# Patient Record
Sex: MALE | Race: OTHER | NOT HISPANIC OR LATINO | URBAN - METROPOLITAN AREA
[De-identification: names, ages, dates, MRNs, and addresses within clinical notes are randomized per-mention and may not be internally consistent; named-entity substitution may affect disease eponyms.]

---

## 2017-09-30 ENCOUNTER — EMERGENCY (EMERGENCY)
Facility: HOSPITAL | Age: 37
LOS: 1 days | Discharge: HOME | End: 2017-09-30
Attending: EMERGENCY MEDICINE
Payer: MEDICAID

## 2017-09-30 VITALS
HEART RATE: 66 BPM | OXYGEN SATURATION: 99 % | SYSTOLIC BLOOD PRESSURE: 121 MMHG | TEMPERATURE: 98 F | HEIGHT: 66 IN | RESPIRATION RATE: 16 BRPM | DIASTOLIC BLOOD PRESSURE: 74 MMHG | WEIGHT: 134.92 LBS

## 2017-09-30 DIAGNOSIS — R51 HEADACHE: ICD-10-CM

## 2017-09-30 DIAGNOSIS — R42 DIZZINESS AND GIDDINESS: ICD-10-CM

## 2017-09-30 PROCEDURE — 99285 EMERGENCY DEPT VISIT HI MDM: CPT | Mod: 25

## 2017-09-30 RX ORDER — OXYCODONE AND ACETAMINOPHEN 5; 325 MG/1; MG/1
1 TABLET ORAL ONCE
Qty: 0 | Refills: 0 | Status: DISCONTINUED | OUTPATIENT
Start: 2017-09-30 | End: 2017-09-30

## 2017-09-30 RX ORDER — BUTALBITAL/ACETAMINOPHEN 50MG-650MG
0 TABLET ORAL
Qty: 0 | Refills: 0 | COMMUNITY

## 2017-09-30 NOTE — ED PROVIDER NOTE - OBJECTIVE STATEMENT
36 y/o M pt with PMHx of headache to the back of the head with associated eye pain x 6 months; headache is worse in the morning. Pt reports he has seen his PMD for the headache complaint and was prescribed an unknown medication without any relief. Pt was also referred to f/u with a neurologist however, the doctor does not have any sooner appointment therefore pt visits the ED for sooner evaluation. Pt denies fever, chills, visual changes, shortness of breath, cough, chest pain, palpitations, nausea, vomiting, diarrhea, abd pain, numbness, tingling, weakness, H/O migraines, or any other complaints. NKDA.

## 2017-09-30 NOTE — ED PROVIDER NOTE - PROGRESS NOTE DETAILS
Pt reassessed, headache improved.  Labs and imaging negative.  Pt given copy of all results.  Will d/c

## 2017-09-30 NOTE — ED PROVIDER NOTE - CHPI ED SYMPTOMS NEG
no fever, no chills, no visual changes, no nausea, no vomiting, no diarrhea, no abd pain, no numbness, no tingling, no weakness

## 2017-10-01 VITALS
OXYGEN SATURATION: 99 % | HEART RATE: 65 BPM | RESPIRATION RATE: 18 BRPM | DIASTOLIC BLOOD PRESSURE: 68 MMHG | SYSTOLIC BLOOD PRESSURE: 126 MMHG | TEMPERATURE: 99 F

## 2017-10-01 LAB
ANION GAP SERPL CALC-SCNC: 5 MMOL/L — SIGNIFICANT CHANGE UP (ref 5–17)
BASOPHILS # BLD AUTO: 0.1 K/UL — SIGNIFICANT CHANGE UP (ref 0–0.2)
BASOPHILS NFR BLD AUTO: 1.6 % — SIGNIFICANT CHANGE UP (ref 0–2)
BUN SERPL-MCNC: 13 MG/DL — SIGNIFICANT CHANGE UP (ref 7–18)
CALCIUM SERPL-MCNC: 8.5 MG/DL — SIGNIFICANT CHANGE UP (ref 8.4–10.5)
CHLORIDE SERPL-SCNC: 105 MMOL/L — SIGNIFICANT CHANGE UP (ref 96–108)
CO2 SERPL-SCNC: 28 MMOL/L — SIGNIFICANT CHANGE UP (ref 22–31)
CREAT SERPL-MCNC: 1.22 MG/DL — SIGNIFICANT CHANGE UP (ref 0.5–1.3)
EOSINOPHIL # BLD AUTO: 0.5 K/UL — SIGNIFICANT CHANGE UP (ref 0–0.5)
EOSINOPHIL NFR BLD AUTO: 8 % — HIGH (ref 0–6)
GLUCOSE SERPL-MCNC: 104 MG/DL — HIGH (ref 70–99)
HCT VFR BLD CALC: 44 % — SIGNIFICANT CHANGE UP (ref 39–50)
HGB BLD-MCNC: 15 G/DL — SIGNIFICANT CHANGE UP (ref 13–17)
LYMPHOCYTES # BLD AUTO: 2.4 K/UL — SIGNIFICANT CHANGE UP (ref 1–3.3)
LYMPHOCYTES # BLD AUTO: 41.2 % — SIGNIFICANT CHANGE UP (ref 13–44)
MCHC RBC-ENTMCNC: 31 PG — SIGNIFICANT CHANGE UP (ref 27–34)
MCHC RBC-ENTMCNC: 34.2 GM/DL — SIGNIFICANT CHANGE UP (ref 32–36)
MCV RBC AUTO: 90.6 FL — SIGNIFICANT CHANGE UP (ref 80–100)
MONOCYTES # BLD AUTO: 0.5 K/UL — SIGNIFICANT CHANGE UP (ref 0–0.9)
MONOCYTES NFR BLD AUTO: 9 % — SIGNIFICANT CHANGE UP (ref 2–14)
NEUTROPHILS # BLD AUTO: 2.3 K/UL — SIGNIFICANT CHANGE UP (ref 1.8–7.4)
NEUTROPHILS NFR BLD AUTO: 40.2 % — LOW (ref 43–77)
PLATELET # BLD AUTO: 188 K/UL — SIGNIFICANT CHANGE UP (ref 150–400)
POTASSIUM SERPL-MCNC: 4 MMOL/L — SIGNIFICANT CHANGE UP (ref 3.5–5.3)
POTASSIUM SERPL-SCNC: 4 MMOL/L — SIGNIFICANT CHANGE UP (ref 3.5–5.3)
RBC # BLD: 4.86 M/UL — SIGNIFICANT CHANGE UP (ref 4.2–5.8)
RBC # FLD: 11.4 % — SIGNIFICANT CHANGE UP (ref 10.3–14.5)
SODIUM SERPL-SCNC: 138 MMOL/L — SIGNIFICANT CHANGE UP (ref 135–145)
WBC # BLD: 5.8 K/UL — SIGNIFICANT CHANGE UP (ref 3.8–10.5)
WBC # FLD AUTO: 5.8 K/UL — SIGNIFICANT CHANGE UP (ref 3.8–10.5)

## 2017-10-01 PROCEDURE — 80048 BASIC METABOLIC PNL TOTAL CA: CPT

## 2017-10-01 PROCEDURE — 99284 EMERGENCY DEPT VISIT MOD MDM: CPT | Mod: 25

## 2017-10-01 PROCEDURE — 70450 CT HEAD/BRAIN W/O DYE: CPT | Mod: 26

## 2017-10-01 PROCEDURE — 85027 COMPLETE CBC AUTOMATED: CPT

## 2017-10-01 PROCEDURE — 70450 CT HEAD/BRAIN W/O DYE: CPT

## 2017-10-01 RX ADMIN — OXYCODONE AND ACETAMINOPHEN 1 TABLET(S): 5; 325 TABLET ORAL at 00:17

## 2017-10-01 RX ADMIN — OXYCODONE AND ACETAMINOPHEN 1 TABLET(S): 5; 325 TABLET ORAL at 01:00

## 2019-05-10 ENCOUNTER — EMERGENCY (EMERGENCY)
Facility: HOSPITAL | Age: 39
LOS: 1 days | Discharge: HOME | End: 2019-05-10
Attending: EMERGENCY MEDICINE | Admitting: EMERGENCY MEDICINE
Payer: MEDICAID

## 2019-05-10 VITALS
SYSTOLIC BLOOD PRESSURE: 103 MMHG | OXYGEN SATURATION: 100 % | TEMPERATURE: 98 F | DIASTOLIC BLOOD PRESSURE: 71 MMHG | RESPIRATION RATE: 16 BRPM | HEART RATE: 68 BPM

## 2019-05-10 VITALS
TEMPERATURE: 98 F | OXYGEN SATURATION: 99 % | RESPIRATION RATE: 16 BRPM | DIASTOLIC BLOOD PRESSURE: 69 MMHG | HEART RATE: 88 BPM | SYSTOLIC BLOOD PRESSURE: 122 MMHG

## 2019-05-10 LAB
ALBUMIN SERPL ELPH-MCNC: 4.54 G/DL — SIGNIFICANT CHANGE UP (ref 3.3–5)
ALP SERPL-CCNC: 50 U/L — SIGNIFICANT CHANGE UP (ref 40–120)
ALT FLD-CCNC: 16 U/L — SIGNIFICANT CHANGE UP (ref 4–41)
ANION GAP SERPL CALC-SCNC: 13 MMO/L — SIGNIFICANT CHANGE UP (ref 7–14)
APPEARANCE UR: CLEAR — SIGNIFICANT CHANGE UP
AST SERPL-CCNC: 18 U/L — SIGNIFICANT CHANGE UP (ref 4–40)
BASOPHILS # BLD AUTO: 0.06 K/UL — SIGNIFICANT CHANGE UP (ref 0–0.2)
BASOPHILS NFR BLD AUTO: 0.7 % — SIGNIFICANT CHANGE UP (ref 0–2)
BILIRUB SERPL-MCNC: 0.3 MG/DL — SIGNIFICANT CHANGE UP (ref 0.2–1.2)
BILIRUB UR-MCNC: NEGATIVE — SIGNIFICANT CHANGE UP
BLOOD UR QL VISUAL: NEGATIVE — SIGNIFICANT CHANGE UP
BUN SERPL-MCNC: 22 MG/DL — SIGNIFICANT CHANGE UP (ref 7–23)
CALCIUM SERPL-MCNC: 9.5 MG/DL — SIGNIFICANT CHANGE UP (ref 8.4–10.5)
CHLORIDE SERPL-SCNC: 100 MMOL/L — SIGNIFICANT CHANGE UP (ref 98–107)
CO2 SERPL-SCNC: 26 MMOL/L — SIGNIFICANT CHANGE UP (ref 22–31)
COLOR SPEC: SIGNIFICANT CHANGE UP
CREAT SERPL-MCNC: 1.43 MG/DL — HIGH (ref 0.5–1.3)
EOSINOPHIL # BLD AUTO: 0.44 K/UL — SIGNIFICANT CHANGE UP (ref 0–0.5)
EOSINOPHIL NFR BLD AUTO: 5.4 % — SIGNIFICANT CHANGE UP (ref 0–6)
GLUCOSE SERPL-MCNC: 87 MG/DL — SIGNIFICANT CHANGE UP (ref 70–99)
GLUCOSE UR-MCNC: NEGATIVE — SIGNIFICANT CHANGE UP
HCT VFR BLD CALC: 39.8 % — SIGNIFICANT CHANGE UP (ref 39–50)
HGB BLD-MCNC: 13.9 G/DL — SIGNIFICANT CHANGE UP (ref 13–17)
IMM GRANULOCYTES NFR BLD AUTO: 0.2 % — SIGNIFICANT CHANGE UP (ref 0–1.5)
KETONES UR-MCNC: NEGATIVE — SIGNIFICANT CHANGE UP
LEUKOCYTE ESTERASE UR-ACNC: NEGATIVE — SIGNIFICANT CHANGE UP
LYMPHOCYTES # BLD AUTO: 3 K/UL — SIGNIFICANT CHANGE UP (ref 1–3.3)
LYMPHOCYTES # BLD AUTO: 36.9 % — SIGNIFICANT CHANGE UP (ref 13–44)
MCHC RBC-ENTMCNC: 29.6 PG — SIGNIFICANT CHANGE UP (ref 27–34)
MCHC RBC-ENTMCNC: 34.9 % — SIGNIFICANT CHANGE UP (ref 32–36)
MCV RBC AUTO: 84.7 FL — SIGNIFICANT CHANGE UP (ref 80–100)
MONOCYTES # BLD AUTO: 0.8 K/UL — SIGNIFICANT CHANGE UP (ref 0–0.9)
MONOCYTES NFR BLD AUTO: 9.8 % — SIGNIFICANT CHANGE UP (ref 2–14)
NEUTROPHILS # BLD AUTO: 3.81 K/UL — SIGNIFICANT CHANGE UP (ref 1.8–7.4)
NEUTROPHILS NFR BLD AUTO: 47 % — SIGNIFICANT CHANGE UP (ref 43–77)
NITRITE UR-MCNC: NEGATIVE — SIGNIFICANT CHANGE UP
NRBC # FLD: 0 K/UL — SIGNIFICANT CHANGE UP (ref 0–0)
PH UR: 6.5 — SIGNIFICANT CHANGE UP (ref 5–8)
PLATELET # BLD AUTO: 206 K/UL — SIGNIFICANT CHANGE UP (ref 150–400)
PMV BLD: 8.9 FL — SIGNIFICANT CHANGE UP (ref 7–13)
POTASSIUM SERPL-MCNC: 4.1 MMOL/L — SIGNIFICANT CHANGE UP (ref 3.5–5.3)
POTASSIUM SERPL-SCNC: 4.1 MMOL/L — SIGNIFICANT CHANGE UP (ref 3.5–5.3)
PROT SERPL-MCNC: 7.1 G/DL — SIGNIFICANT CHANGE UP (ref 6–8.3)
PROT UR-MCNC: NEGATIVE — SIGNIFICANT CHANGE UP
RBC # BLD: 4.7 M/UL — SIGNIFICANT CHANGE UP (ref 4.2–5.8)
RBC # FLD: 12 % — SIGNIFICANT CHANGE UP (ref 10.3–14.5)
SODIUM SERPL-SCNC: 139 MMOL/L — SIGNIFICANT CHANGE UP (ref 135–145)
SP GR SPEC: 1.02 — SIGNIFICANT CHANGE UP (ref 1–1.04)
UROBILINOGEN FLD QL: NORMAL — SIGNIFICANT CHANGE UP
WBC # BLD: 8.13 K/UL — SIGNIFICANT CHANGE UP (ref 3.8–10.5)
WBC # FLD AUTO: 8.13 K/UL — SIGNIFICANT CHANGE UP (ref 3.8–10.5)

## 2019-05-10 PROCEDURE — 99284 EMERGENCY DEPT VISIT MOD MDM: CPT

## 2019-05-10 PROCEDURE — 74176 CT ABD & PELVIS W/O CONTRAST: CPT | Mod: 26,GC

## 2019-05-10 RX ORDER — SODIUM CHLORIDE 9 MG/ML
3 INJECTION INTRAMUSCULAR; INTRAVENOUS; SUBCUTANEOUS ONCE
Refills: 0 | Status: COMPLETED | OUTPATIENT
Start: 2019-05-10 | End: 2019-05-10

## 2019-05-10 RX ORDER — KETOROLAC TROMETHAMINE 30 MG/ML
30 SYRINGE (ML) INJECTION ONCE
Refills: 0 | Status: DISCONTINUED | OUTPATIENT
Start: 2019-05-10 | End: 2019-05-10

## 2019-05-10 RX ADMIN — SODIUM CHLORIDE 3 MILLILITER(S): 9 INJECTION INTRAMUSCULAR; INTRAVENOUS; SUBCUTANEOUS at 22:04

## 2019-05-10 RX ADMIN — Medication 30 MILLIGRAM(S): at 22:04

## 2019-05-10 NOTE — ED ADULT NURSE NOTE - OBJECTIVE STATEMENT
pt is in bed A and OX 3  in NAD, pt reports ' I have pain in my left side of the abdomen and it goes to my left leg and my leg gets numb' pt reports hx of sciatica, denies fever or chills c/o burning on urination.  abd soft non distended BS present 4 quadrants. pt ambulates with a steady giant, extremities are equal in strength B/L.

## 2019-05-10 NOTE — ED ADULT NURSE NOTE - ED STAT RN HANDOFF DETAILS
Patient left in stable condition, ambulatory, accompanied by family. He denies any pain at that time.

## 2019-05-10 NOTE — ED PROVIDER NOTE - MUSCULOSKELETAL, MLM
Spine appears normal, range of motion is not limited, no muscle or joint tenderness. +Left straight leg raise, left paraspinal tenderness.

## 2019-05-10 NOTE — ED PROVIDER NOTE - CLINICAL SUMMARY MEDICAL DECISION MAKING FREE TEXT BOX
38 y/o male presents to the ED c/o left lower back pain that radiates to groin and left leg for x2 weeks. Will check labs, urinalysis, get imaging and medicate.

## 2019-05-10 NOTE — ED PROVIDER NOTE - CHPI ED SYMPTOMS NEG
No trauma, urinary complaints, hematuria, chills, and bowel or urinary incontinence/no weakness/no fever

## 2019-05-10 NOTE — ED PROVIDER NOTE - OBJECTIVE STATEMENT
40 y/o male with no pertinent PMHx presents to the ED c/o left lower back pain that radiates to groin and left leg for x2 weeks. Pt states that pain worsened 1 week ago after carrying ladder and playing cricket. The following morning, pain was much worse. At time of eval pt notes that is worse after lifting. Pt denies trauma, urinary complaints, hematuria, fever, chills, bowel or urinary incontinence, or weakness.

## 2019-05-10 NOTE — ED ADULT NURSE NOTE - NSIMPLEMENTINTERV_GEN_ALL_ED
Implemented All Universal Safety Interventions:  [facility] to call system. Call bell, personal items and telephone within reach. Instruct patient to call for assistance. Room bathroom lighting operational. Non-slip footwear when patient is off stretcher. Physically safe environment: no spills, clutter or unnecessary equipment. Stretcher in lowest position, wheels locked, appropriate side rails in place.

## 2019-05-11 RX ORDER — IBUPROFEN 200 MG
1 TABLET ORAL
Qty: 30 | Refills: 0
Start: 2019-05-11 | End: 2019-05-20

## 2019-05-11 RX ORDER — CYCLOBENZAPRINE HYDROCHLORIDE 10 MG/1
1 TABLET, FILM COATED ORAL
Qty: 21 | Refills: 0
Start: 2019-05-11 | End: 2019-05-17

## 2019-06-10 ENCOUNTER — EMERGENCY (EMERGENCY)
Facility: HOSPITAL | Age: 39
LOS: 1 days | Discharge: HOME | End: 2019-06-10
Attending: STUDENT IN AN ORGANIZED HEALTH CARE EDUCATION/TRAINING PROGRAM
Payer: MEDICAID

## 2019-06-10 VITALS
TEMPERATURE: 98 F | RESPIRATION RATE: 16 BRPM | OXYGEN SATURATION: 99 % | DIASTOLIC BLOOD PRESSURE: 78 MMHG | SYSTOLIC BLOOD PRESSURE: 120 MMHG | WEIGHT: 130.07 LBS | HEIGHT: 66 IN | HEART RATE: 82 BPM

## 2019-06-10 LAB
ANION GAP SERPL CALC-SCNC: 8 MMOL/L — SIGNIFICANT CHANGE UP (ref 5–17)
BUN SERPL-MCNC: 16 MG/DL — SIGNIFICANT CHANGE UP (ref 7–18)
CALCIUM SERPL-MCNC: 8.2 MG/DL — LOW (ref 8.4–10.5)
CHLORIDE SERPL-SCNC: 104 MMOL/L — SIGNIFICANT CHANGE UP (ref 96–108)
CK SERPL-CCNC: 1923 U/L — HIGH (ref 35–232)
CO2 SERPL-SCNC: 27 MMOL/L — SIGNIFICANT CHANGE UP (ref 22–31)
CREAT SERPL-MCNC: 1.14 MG/DL — SIGNIFICANT CHANGE UP (ref 0.5–1.3)
GLUCOSE SERPL-MCNC: 110 MG/DL — HIGH (ref 70–99)
POTASSIUM SERPL-MCNC: 3.8 MMOL/L — SIGNIFICANT CHANGE UP (ref 3.5–5.3)
POTASSIUM SERPL-SCNC: 3.8 MMOL/L — SIGNIFICANT CHANGE UP (ref 3.5–5.3)
SODIUM SERPL-SCNC: 139 MMOL/L — SIGNIFICANT CHANGE UP (ref 135–145)

## 2019-06-10 PROCEDURE — 99283 EMERGENCY DEPT VISIT LOW MDM: CPT

## 2019-06-10 PROCEDURE — 36415 COLL VENOUS BLD VENIPUNCTURE: CPT

## 2019-06-10 PROCEDURE — 82550 ASSAY OF CK (CPK): CPT

## 2019-06-10 PROCEDURE — 80048 BASIC METABOLIC PNL TOTAL CA: CPT

## 2019-06-10 PROCEDURE — 99284 EMERGENCY DEPT VISIT MOD MDM: CPT

## 2019-06-10 RX ORDER — SODIUM CHLORIDE 9 MG/ML
1000 INJECTION INTRAMUSCULAR; INTRAVENOUS; SUBCUTANEOUS ONCE
Refills: 0 | Status: COMPLETED | OUTPATIENT
Start: 2019-06-10 | End: 2019-06-10

## 2019-06-10 RX ORDER — IBUPROFEN 200 MG
1 TABLET ORAL
Qty: 20 | Refills: 0
Start: 2019-06-10 | End: 2019-06-14

## 2019-06-10 RX ADMIN — SODIUM CHLORIDE 1000 MILLILITER(S): 9 INJECTION INTRAMUSCULAR; INTRAVENOUS; SUBCUTANEOUS at 20:55

## 2019-06-10 RX ADMIN — SODIUM CHLORIDE 1000 MILLILITER(S): 9 INJECTION INTRAMUSCULAR; INTRAVENOUS; SUBCUTANEOUS at 21:00

## 2019-06-10 NOTE — ED PROVIDER NOTE - PHYSICAL EXAMINATION
Bilateral hip full range of motion. Femoral, popliteal and DP/PT pulses intact 2+ bilaterally. R groin localized tenderness. R inner thigh moderate swelling with ecchymosis. Compartments are soft. Pain increased with adduction against resistance.

## 2019-06-10 NOTE — ED PROVIDER NOTE - CLINICAL SUMMARY MEDICAL DECISION MAKING FREE TEXT BOX
Neurovasc intact. Very low suspicion of compartment syndrome. Likely partial adductor muscle tear with hematoma. Will check CK and outpatient ortho follow up within 1 week.

## 2019-06-10 NOTE — ED PROVIDER NOTE - ATTENDING CONTRIBUTION TO CARE
I agree with np assessment  patient presenting with left inner thigh pain   ambulatory  echimosis noted to left medial thigh  compartment soft  distal pulse intact  likely msk pain/partial tear  will obtain labs pain control reeassess  no signs of compartment on exam

## 2019-06-10 NOTE — ED PROVIDER NOTE - OBJECTIVE STATEMENT
39 year-old male, no significant PMHx, presents with cc R inner thigh pain since yesterday. While playing cricket at around 11:30 AM felt sudden onset of R groin pain and felt a tearing sensation. Continued to play the game. In the evening applying heating pad. In the Am woke up with R inner thigh bruising and swelling. Pain is worse today. Worse with adduction movement and with walking. Denies any numbness, tingling, pallor, coldness, testicular pain/swelling/bruising or any other complaints.

## 2019-06-10 NOTE — ED PROVIDER NOTE - PROGRESS NOTE DETAILS
Teodoro WNL. CK 1923. Will dc with ortho follow up within 1 week. Pt is well appearing walking with steady gait, stable for discharge and follow up without fail with medical doctor. I had a detailed discussion with the patient and/or guardian regarding the historical points, exam findings, and any diagnostic results supporting the discharge diagnosis. Pt educated on care and need for follow up. Strict return instructions and red flag signs and symptoms discussed with patient. Questions answered. Pt shows understanding of discharge information and agrees to follow.

## 2019-06-11 PROBLEM — Z78.9 OTHER SPECIFIED HEALTH STATUS: Chronic | Status: ACTIVE | Noted: 2019-05-11

## 2020-07-20 NOTE — ED PROVIDER NOTE - NS ED NOTE AC HIGH RISK COUNTRIES
oriented to person, place and time, normal sensation, short and long term memory intact, sensory exam intact No

## 2022-01-03 NOTE — ED ADULT TRIAGE NOTE - PAIN RATING/NUMBER SCALE (0-10): ACTIVITY
Detail Level: Simple Detail Level: Detailed Detail Level: Zone 8 Patient Specific Counseling (Will Not Stick From Patient To Patient): Went over what he was allergic to when did patch testing on him, Imidazoludydinyl urea and Diazolidinyl urea, printed those sheets out for him so he could go over the products he is using with what he is allergic to.  Also discussed possible referral for more in depth patch testing and UVB phototherapy

## 2024-07-29 PROBLEM — Z00.00 ENCOUNTER FOR PREVENTIVE HEALTH EXAMINATION: Status: ACTIVE | Noted: 2024-07-29

## 2024-07-31 NOTE — HISTORY OF PRESENT ILLNESS
[de-identified] : 43 y/o male with PMHx of  who presents today for evaluation of cervical arachnoid cyst  dx during workup for

## 2024-08-01 NOTE — HISTORY OF PRESENT ILLNESS
[de-identified] : 45 y/o male with PMHx of  who presents today for evaluation of cervical arachnoid cyst  dx during workup for   (bringing imaging with)

## 2024-08-04 NOTE — HISTORY OF PRESENT ILLNESS
[de-identified] : 45 y/o male with PMHx of  who presents today for evaluation of cervical arachnoid cyst  dx during workup for   (bringing imaging with)

## 2024-08-06 ENCOUNTER — APPOINTMENT (OUTPATIENT)
Dept: NEUROSURGERY | Facility: CLINIC | Age: 44
End: 2024-08-06

## 2024-08-06 PROBLEM — M54.12 CERVICAL RADICULOPATHY: Status: ACTIVE | Noted: 2024-08-06

## 2024-08-06 PROBLEM — G93.9 BRAIN LESION: Status: ACTIVE | Noted: 2024-08-06

## 2024-08-06 PROBLEM — G93.0 ARACHNOID CYST: Status: ACTIVE | Noted: 2024-07-31

## 2024-08-06 PROBLEM — R20.0 NUMBNESS AND TINGLING: Status: ACTIVE | Noted: 2024-08-06

## 2024-08-06 PROCEDURE — 99202 OFFICE O/P NEW SF 15 MIN: CPT
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
## 2024-08-06 NOTE — PHYSICAL EXAM
[Oriented To Time, Place, And Person] : oriented to person, place, and time [Impaired Insight] : insight and judgment were intact [Affect] : the affect was normal [Memory Recent] : recent memory was not impaired [Motor Tone] : muscle tone was normal in all four extremities [Motor Strength] : muscle strength was normal in all four extremities [Sensation Tactile Decrease] : light touch was intact [Sclera] : the sclera and conjunctiva were normal [PERRL With Normal Accommodation] : pupils were equal in size, round, reactive to light, with normal accommodation [Neck Appearance] : the appearance of the neck was normal [] : no respiratory distress [Respiration, Rhythm And Depth] : normal respiratory rhythm and effort [Abnormal Walk] : normal gait [Skin Color & Pigmentation] : normal skin color and pigmentation

## 2024-08-06 NOTE — REVIEW OF SYSTEMS
[Numbness] : numbness [Tingling] : tingling [Eyesight Problems] : eyesight problems [As Noted in HPI] : as noted in HPI

## 2024-08-08 ENCOUNTER — APPOINTMENT (OUTPATIENT)
Dept: NEUROSURGERY | Facility: CLINIC | Age: 44
End: 2024-08-08

## 2024-08-08 ENCOUNTER — NON-APPOINTMENT (OUTPATIENT)
Age: 44
End: 2024-08-08

## 2024-09-03 PROBLEM — G93.0 BRAIN CYST: Status: ACTIVE | Noted: 2024-09-03

## 2024-09-04 ENCOUNTER — APPOINTMENT (OUTPATIENT)
Dept: NEUROSURGERY | Facility: CLINIC | Age: 44
End: 2024-09-04
Payer: MEDICAID

## 2024-09-04 VITALS
WEIGHT: 149 LBS | DIASTOLIC BLOOD PRESSURE: 75 MMHG | SYSTOLIC BLOOD PRESSURE: 111 MMHG | HEART RATE: 85 BPM | OXYGEN SATURATION: 98 % | HEIGHT: 66 IN | RESPIRATION RATE: 18 BRPM | BODY MASS INDEX: 23.95 KG/M2

## 2024-09-04 DIAGNOSIS — G93.0 CEREBRAL CYSTS: ICD-10-CM

## 2024-09-04 DIAGNOSIS — R51.9 HEADACHE, UNSPECIFIED: ICD-10-CM

## 2024-09-04 DIAGNOSIS — H53.8 OTHER VISUAL DISTURBANCES: ICD-10-CM

## 2024-09-04 PROCEDURE — 99202 OFFICE O/P NEW SF 15 MIN: CPT

## 2024-09-04 NOTE — DATA REVIEWED
[de-identified] : 	 Exam requested by: DOMINIQUE MARTINEZ  [address] [facility] [address] SITE PERFORMED: [facility] PHONE: [phone] Patient: RONY CHURCHILL YOB: 1980 Phone: [phone] MRN: [id number] Acc: [id number] Date of Exam: 08-   EXAM:  MRI BRAIN WITHOUT AND WITH CONTRAST  HISTORY:  Brain lesion/cyst seen on cervical spine MRI.  TECHNIQUE: Multiplanar multisequence MR of the whole brain includes triplanar gradient echo localizer, SWI, spin echo axial T1, T2, diffusion, volumetric FLAIR with multiplanar reformats, and volumetric postcontrast T1-weighted images with multiplanar reformats. Images were obtained on a 3T MR unit.  IV Contrast:  14 ml of Clariscan was injected from a 20 ml single use vial.  COMPARISON: MRI of the cervical spine dated 1/27/2024.  FINDINGS:  Brain Parenchyma: There is a solitary punctate focus of T2 prolongation in the left frontal subcortical white matter (series 9 image 55), nonspecific. There is no mass effect, midline shift, acute intracranial hemorrhage, acute infarct, or abnormal enhancement of the brain.  Ventricular System and Extra-Axial Spaces: There is a left retrocerebellar arachnoid cyst. Ventricles and sulci are otherwise normal in size for the patient's age.    Calvarium: Unremarkable.  Midline Structures / Skull Base / Craniocervical Junction: Unremarkable.  Vascular System: Flow voids for the proximal basilar and internal carotid arteries are present, consistent with their patency.  Paranasal Sinuses, Mastoids and Orbits: Mild paranasal sinus mucosal thickening. Nasal turbinate hypertrophy.  IMPRESSION:  Left retrocerebellar arachnoid cyst.   Solitary punctate focus of T2 prolongation in the left frontal subcortical white matter, nonspecific.  Otherwise unremarkable MRI of the brain without and with contrast.  Thank you for the opportunity to participate in the care of this patient.     Jeronimo Perez MD  - Electronically Signed: 08- 2:22 PM
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
## 2024-09-04 NOTE — PHYSICAL EXAM
[Oriented To Time, Place, And Person] : oriented to person, place, and time [Impaired Insight] : insight and judgment were intact [Affect] : the affect was normal [Memory Recent] : recent memory was not impaired [Motor Tone] : muscle tone was normal in all four extremities [Motor Strength] : muscle strength was normal in all four extremities [Sclera] : the sclera and conjunctiva were normal [Neck Appearance] : the appearance of the neck was normal [] : no respiratory distress [Respiration, Rhythm And Depth] : normal respiratory rhythm and effort [Abnormal Walk] : normal gait [Skin Color & Pigmentation] : normal skin color and pigmentation [Aaron] : Aaron's sign was not demonstrated [FreeTextEntry5] : CN II-XII grossly intact
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
## 2024-09-04 NOTE — ASSESSMENT
[FreeTextEntry1] : 44M with incidental arachnoid cyst. Stable on MRI. Continues to have headaches and blurry vision. Unlikely related. Will refer to neurology for headaches and neuro-ophthalmology for evaluation. No surgical intervention  Dr. D'Amico independently reviewed all available images with patient and his spouse.    PLAN: - Referral to neurology for headache evaluation/ consultation - Referral to neuro-opth Leonel Madrigal for evaluation of blurred vision  - RTC prn   Patient verbalizes understanding of today's discussion and next steps in treatment plan.   A total of 15 minutes was spent reviewing the labs, imaging and physical examination of the patient. We discussed the diagnosis, and the plan. The patient's questions were answered. The patient demonstrated an excellent understanding of the plan.

## 2024-09-04 NOTE — DATA REVIEWED
[de-identified] : 	 Exam requested by: DOMINIQUE MARTINEZ  [address] [facility] [address] SITE PERFORMED: [facility] PHONE: [phone] Patient: RONY CHURCHILL YOB: 1980 Phone: [phone] MRN: [id number] Acc: [id number] Date of Exam: 08-   EXAM:  MRI BRAIN WITHOUT AND WITH CONTRAST  HISTORY:  Brain lesion/cyst seen on cervical spine MRI.  TECHNIQUE: Multiplanar multisequence MR of the whole brain includes triplanar gradient echo localizer, SWI, spin echo axial T1, T2, diffusion, volumetric FLAIR with multiplanar reformats, and volumetric postcontrast T1-weighted images with multiplanar reformats. Images were obtained on a 3T MR unit.  IV Contrast:  14 ml of Clariscan was injected from a 20 ml single use vial.  COMPARISON: MRI of the cervical spine dated 1/27/2024.  FINDINGS:  Brain Parenchyma: There is a solitary punctate focus of T2 prolongation in the left frontal subcortical white matter (series 9 image 55), nonspecific. There is no mass effect, midline shift, acute intracranial hemorrhage, acute infarct, or abnormal enhancement of the brain.  Ventricular System and Extra-Axial Spaces: There is a left retrocerebellar arachnoid cyst. Ventricles and sulci are otherwise normal in size for the patient's age.    Calvarium: Unremarkable.  Midline Structures / Skull Base / Craniocervical Junction: Unremarkable.  Vascular System: Flow voids for the proximal basilar and internal carotid arteries are present, consistent with their patency.  Paranasal Sinuses, Mastoids and Orbits: Mild paranasal sinus mucosal thickening. Nasal turbinate hypertrophy.  IMPRESSION:  Left retrocerebellar arachnoid cyst.   Solitary punctate focus of T2 prolongation in the left frontal subcortical white matter, nonspecific.  Otherwise unremarkable MRI of the brain without and with contrast.  Thank you for the opportunity to participate in the care of this patient.     Jeronimo Perez MD  - Electronically Signed: 08- 2:22 PM

## 2024-10-10 ENCOUNTER — APPOINTMENT (OUTPATIENT)
Dept: OPHTHALMOLOGY | Facility: CLINIC | Age: 44
End: 2024-10-10

## 2024-11-14 ENCOUNTER — APPOINTMENT (OUTPATIENT)
Dept: OPHTHALMOLOGY | Facility: CLINIC | Age: 44
End: 2024-11-14

## 2025-08-20 ENCOUNTER — APPOINTMENT (OUTPATIENT)
Dept: OPHTHALMOLOGY | Facility: CLINIC | Age: 45
End: 2025-08-20